# Patient Record
(demographics unavailable — no encounter records)

---

## 2024-10-14 NOTE — CARDIOLOGY SUMMARY
[de-identified] : TTE 7/21/22 EF 65%, no WMA, minimal MR [de-identified] : 8/22/22 SR @ 76 bpm, Normal EKG, specifically normal intervals (MO, QTc) no evidence of pre-excitation, HIS Purkinje system disease, early repolarization, Bragada pattern, criteria for LV or RV hypertrophy, abnormal T wave inversion or Epsilon waves\par

## 2024-10-14 NOTE — ADDENDUM
[FreeTextEntry1] : I, Sanjuanita Flores, am scribing for and the presence of Dr. Hobbs the following sections: HPI, PMH,Family/social history, ROS, Physical Exam, Assessment / Plan.   I, Rosa Hobbs, personally performed the services described in the documentation, reviewed the documentation recorded by the scribe in my presence and it accurately and completely records my words and actions.

## 2024-10-14 NOTE — CARDIOLOGY SUMMARY
[de-identified] : TTE 7/21/22 EF 65%, no WMA, minimal MR [de-identified] : 8/22/22 SR @ 76 bpm, Normal EKG, specifically normal intervals (ID, QTc) no evidence of pre-excitation, HIS Purkinje system disease, early repolarization, Bragada pattern, criteria for LV or RV hypertrophy, abnormal T wave inversion or Epsilon waves\par

## 2024-10-14 NOTE — HISTORY OF PRESENT ILLNESS
[FreeTextEntry1] : Ms. Elise is a pleasant 67 year-old female with a past medical history significant for Anemia, DM II, HLD, HTN, Asthma and syncope s/p ILR placement for long-term heart rhythm assessment on 9/19/22.  She reports an episode of syncope without prodrome 6/2022 around noon.  She describes swiping her metro card, picking up her bag and then the next thing she remembers is waking up on a stretcher with EMS.  Reports she [place] the subway stairs and suffered laceration to left lateral orbital arch.  She reports she was in and out of consciousness on the way to [facility].  Reports she was told she had no seizure like activity.  CT scan done, ultimately signed out AMA.  She had eaten earlier that day and had herbal tea.  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  Two prior tachy detections on ILR consistent with sinus tachycardia.    She has had no recurrent presyncope/syncope since device placement.  SEE PACEART

## 2024-10-14 NOTE — HISTORY OF PRESENT ILLNESS
[FreeTextEntry1] : Ms. Elise is a pleasant 67 year-old female with a past medical history significant for Anemia, DM II, HLD, HTN, Asthma and syncope s/p ILR placement for long-term heart rhythm assessment on 9/19/22.  She reports an episode of syncope without prodrome 6/2022 around noon.  She describes swiping her metro card, picking up her bag and then the next thing she remembers is waking up on a stretcher with EMS.  Reports she [place] the subway stairs and suffered laceration to left lateral orbital arch.  She reports she was in and out of consciousness on the way to [facility].  Reports she was told she had no seizure like activity.  CT scan done, ultimately signed out AMA.  She had eaten earlier that day and had herbal tea.  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  Two prior tachy detections on ILR consistent with sinus tachycardia.    She has had no recurrent presyncope/syncope since device placement.  SEE PACEART

## 2024-10-14 NOTE — CARDIOLOGY SUMMARY
[de-identified] : 8/22/22 SR @ 76 bpm, Normal EKG, specifically normal intervals (MN, QTc) no evidence of pre-excitation, HIS Purkinje system disease, early repolarization, Bragada pattern, criteria for LV or RV hypertrophy, abnormal T wave inversion or Epsilon waves\par   [de-identified] : TTE 7/21/22 EF 65%, no WMA, minimal MR

## 2024-10-14 NOTE — DISCUSSION/SUMMARY
[FreeTextEntry1] : Ms. Elise is a pleasant 67 year-old female with a past medical history significant for Anemia, DM II, HLD, HTN, Asthma and syncope s/p ILR placement 9/19/22.    1.  Syncope s/p ILR for long term heart rhythm assessment No recurrence since device placement.  No arrhythmias noted. The device is functioning appropriately as programmed and there have been no arrhythmia events since device placement.  The patient is enrolled in remote monitoring and was asked to return for follow-up in six months.  Advised to call with any questions or concerns in the interim.

## 2024-10-17 NOTE — PROCEDURE
[de-identified] : Procedure: Knee joint injection Laterality:  BILATERAL Indication: Osteoarthritis - discussed with patient Skin prep: alcohol and chlorhexidine Anesthesia: ethyl chloride spray Needle: 20-gauge Portal: inferolateral Aspiration: none Injectate: 2cc of 2% lidocaine, 2cc of 0.5% bupivacaine, and 1cc of 40mg/mL triamcinolone Dressing: Band-aid Complications: None  - RTC in 3 months to repeat bilateral knee CSI - BILATERAL knee HA injections ordered today

## 2024-10-17 NOTE — END OF VISIT
[FreeTextEntry3] : All medical record entries made by Fauzia Montenegro ([employer]) were at my, Dr. Jimmy Espinosa, direction and personally dictated by me on 10/15/2024. I have reviewed the chart and agree that the record accurately reflects my personal performance of the history, physical exam, assessment, and plan. I have also personally directed, reviewed, and agreed with the chart.

## 2024-10-17 NOTE — RESULTS/DATA
[TextEntry] : Bilateral knee X-rays: Demonstrate Right knee: valgus alignment. Tricompartmental OA , most pronounced laterally with bone on bone articulation. Patella sits at normal height and tracks centrally. Left knee: valgus alignment. Tricompartmental OA , most pronounced laterally with bone on bone articulation. Patella sits at normal height and tracks centrally.

## 2024-10-17 NOTE — PROCEDURE
[de-identified] : Procedure: Knee joint injection Laterality:  BILATERAL Indication: Osteoarthritis - discussed with patient Skin prep: alcohol and chlorhexidine Anesthesia: ethyl chloride spray Needle: 20-gauge Portal: inferolateral Aspiration: none Injectate: 2cc of 2% lidocaine, 2cc of 0.5% bupivacaine, and 1cc of 40mg/mL triamcinolone Dressing: Band-aid Complications: None  - RTC in 3 months to repeat bilateral knee CSI - BILATERAL knee HA injections ordered today

## 2024-10-22 NOTE — REASON FOR VISIT
[Initial Consultation] : an initial consultation for [FreeTextEntry2] : Anemia, history of pernicious anemia

## 2024-10-22 NOTE — HISTORY OF PRESENT ILLNESS
[de-identified] : The patient is a 67-year-old female with a history of visual field deficits since age 19, diabetes, osteoarthritis, asthma, past syncopal episode, chronic anemia, history of pernicious anemia, referred for evaluation and further management of anemia.  Patient states that she has been anemic "all of her life".  She had been receiving B12 injections for deficiency in the past but this was discontinued when the B12 level was reported to be elevated. In June, the hemoglobin was 10.8, hematocrit 34.1, MCV 81.8 with a normal white blood count, differential and platelet count.  Patient had an elevated hemoglobin A1c at 10.2 at the time and the TSH was normal at 2.89. In September, the hemoglobin was improved at 11.4 and hematocrit 37.4. The patient has been taking ferrous sulfate for the past few months.  She had a negative Cologuard test earlier this year.   She has osteoarthritis and has received several gel injections in her knees. Patient's mother who is in her 90s has had chronic anemia due to myelodysplastic syndrome. She denies blood from nose or mouth, in urine or in stool.  She denies chest pain, shortness of breath, abdominal pain, nausea, headache, tinnitus, recent or frequent infections.

## 2024-10-22 NOTE — REVIEW OF SYSTEMS
[Vision Problems] : vision problems [Joint Pain] : joint pain [Joint Stiffness] : joint stiffness [Negative] : Allergic/Immunologic [Fever] : no fever [Chills] : no chills [Night Sweats] : no night sweats [Fatigue] : no fatigue [Recent Change In Weight] : ~T no recent weight change [Chest Pain] : no chest pain [Lower Ext Edema] : no lower extremity edema [Shortness Of Breath] : no shortness of breath [Abdominal Pain] : no abdominal pain [Skin Rash] : no skin rash [Dizziness] : no dizziness [Fainting] : no fainting [Muscle Weakness] : no muscle weakness [Easy Bleeding] : no tendency for easy bleeding [Easy Bruising] : no tendency for easy bruising [Swollen Glands] : no swollen glands [FreeTextEntry2] : now on Ozempic [FreeTextEntry3] : since age 19, field deficit left > right [FreeTextEntry9] : Knees

## 2024-10-22 NOTE — ASSESSMENT
[FreeTextEntry1] : The patient is a 67-year-old female with a history of visual field deficits since age 19, diabetes, osteoarthritis, asthma, past syncopal episode, chronic anemia, history of pernicious anemia, referred for evaluation and further management of anemia. Possible etiologies include intermittent nutritional deficiencies in B12, folate, iron, autoimmune disorders, medications, gammopathy or other.  Have ordered MEÑO, B12, folate, CBC, CMP, ferritin, haptoglobin, iron panel, LDH, manual differential, protein electrophoresis, reticulocyte count, rheumatoid factor, and sedimentation rate.  Venipuncture was performed in the office today.  Patient was advised to call office to discuss results and further management.  Pending results, patient will return in 3 months.

## 2024-10-22 NOTE — PHYSICAL EXAM
[Normal] : affect appropriate [de-identified] : overweight [de-identified] : conjunctival pallor [de-identified] : Has loop recorder [de-identified] : obese

## 2024-10-22 NOTE — CONSULT LETTER
[Dear  ___] : Dear  [unfilled], [( Thank you for referring [unfilled] for consultation for _____ )] : Thank you for referring [unfilled] for consultation for [unfilled] [Please see my note below.] : Please see my note below. [Consult Closing:] : Thank you very much for allowing me to participate in the care of this patient.  If you have any questions, please do not hesitate to contact me. [Sincerely,] : Sincerely, [FreeTextEntry3] : Mare Velázquez

## 2024-11-18 NOTE — PROCEDURE
[de-identified] : MonoVisc injection - bilateral knee joint  LOT# [id number] EXP: 2027-06-25 MAN: [employer] NDC: [id number]   Procedure: Knee joint injection Laterality:  BILATERAL Indication: Osteoarthritis - discussed with patient Skin prep: alcohol and chlorhexidine Anesthesia: ethyl chloride spray Needle: 20-gauge Portal: superolateral Aspiration: none Injectate: MONOVISC Dressing: Band-aid Complications: None  - RTC in 2 months to repeat bilateral knee CSI

## 2025-05-02 NOTE — PROCEDURE
[de-identified] : Reports ongoing good relief from serial CSI/HA; would like to continue.  Procedure: Knee joint injection Laterality: Left Indication: Osteoarthritis - discussed with patient Skin prep: alcohol and chlorhexidine Anesthesia: ethyl chloride spray Needle: 20-gauge Portal: inferolateral Aspiration: none Injectate: 2cc of 2% lidocaine, 2cc of 0.5% bupivacaine, and 1cc of 40mg/mL triamcinolone Dressing: Band-aid Complications: None  Procedure: Knee joint aspiration and injection Laterality: Right Indication: Osteoarthritis - discussed with patient Skin prep: alcohol and chlorhexidine Anesthesia: ethyl chloride spray Needle: 18-gauge Portal: superolateral Aspiration: 5cc normal appearing synovial fluid Injectate: 2cc of 2% lidocaine, 2cc of 0.5% bupivacaine, and 1cc of 40mg/mL triamcinolone Dressing: Band-aid Complications: None  Bilateral HA reordered.

## 2025-05-02 NOTE — PROCEDURE
[de-identified] : Reports ongoing good relief from serial CSI/HA; would like to continue.  Procedure: Knee joint injection Laterality: Left Indication: Osteoarthritis - discussed with patient Skin prep: alcohol and chlorhexidine Anesthesia: ethyl chloride spray Needle: 20-gauge Portal: inferolateral Aspiration: none Injectate: 2cc of 2% lidocaine, 2cc of 0.5% bupivacaine, and 1cc of 40mg/mL triamcinolone Dressing: Band-aid Complications: None  Procedure: Knee joint aspiration and injection Laterality: Right Indication: Osteoarthritis - discussed with patient Skin prep: alcohol and chlorhexidine Anesthesia: ethyl chloride spray Needle: 18-gauge Portal: superolateral Aspiration: 5cc normal appearing synovial fluid Injectate: 2cc of 2% lidocaine, 2cc of 0.5% bupivacaine, and 1cc of 40mg/mL triamcinolone Dressing: Band-aid Complications: None  Bilateral HA reordered.